# Patient Record
Sex: MALE | Race: BLACK OR AFRICAN AMERICAN | NOT HISPANIC OR LATINO | URBAN - METROPOLITAN AREA
[De-identification: names, ages, dates, MRNs, and addresses within clinical notes are randomized per-mention and may not be internally consistent; named-entity substitution may affect disease eponyms.]

---

## 2017-01-22 ENCOUNTER — EMERGENCY (EMERGENCY)
Facility: HOSPITAL | Age: 27
LOS: 1 days | Discharge: HOME | End: 2017-01-22
Attending: EMERGENCY MEDICINE
Payer: MEDICAID

## 2017-01-22 VITALS
RESPIRATION RATE: 20 BRPM | HEART RATE: 78 BPM | DIASTOLIC BLOOD PRESSURE: 78 MMHG | SYSTOLIC BLOOD PRESSURE: 124 MMHG | OXYGEN SATURATION: 100 % | TEMPERATURE: 98 F

## 2017-01-22 VITALS
TEMPERATURE: 98 F | WEIGHT: 210.1 LBS | HEIGHT: 73 IN | DIASTOLIC BLOOD PRESSURE: 83 MMHG | SYSTOLIC BLOOD PRESSURE: 160 MMHG | RESPIRATION RATE: 24 BRPM | HEART RATE: 75 BPM | OXYGEN SATURATION: 100 %

## 2017-01-22 DIAGNOSIS — K29.20 ALCOHOLIC GASTRITIS WITHOUT BLEEDING: ICD-10-CM

## 2017-01-22 DIAGNOSIS — R06.00 DYSPNEA, UNSPECIFIED: ICD-10-CM

## 2017-01-22 LAB
ALBUMIN SERPL ELPH-MCNC: 4.9 G/DL — SIGNIFICANT CHANGE UP (ref 3.3–5.2)
ALP SERPL-CCNC: 43 U/L — SIGNIFICANT CHANGE UP (ref 40–120)
ALT FLD-CCNC: 40 U/L — SIGNIFICANT CHANGE UP
ANION GAP SERPL CALC-SCNC: 22 MMOL/L — HIGH (ref 5–17)
AST SERPL-CCNC: 44 U/L — HIGH
BASOPHILS # BLD AUTO: 0 K/UL — SIGNIFICANT CHANGE UP (ref 0–0.2)
BASOPHILS NFR BLD AUTO: 0.2 % — SIGNIFICANT CHANGE UP (ref 0–2)
BILIRUB SERPL-MCNC: 0.8 MG/DL — SIGNIFICANT CHANGE UP (ref 0.4–2)
BUN SERPL-MCNC: 18 MG/DL — SIGNIFICANT CHANGE UP (ref 8–20)
CALCIUM SERPL-MCNC: 10.2 MG/DL — SIGNIFICANT CHANGE UP (ref 8.6–10.2)
CHLORIDE SERPL-SCNC: 99 MMOL/L — SIGNIFICANT CHANGE UP (ref 98–107)
CO2 SERPL-SCNC: 20 MMOL/L — LOW (ref 22–29)
CREAT SERPL-MCNC: 1.14 MG/DL — SIGNIFICANT CHANGE UP (ref 0.5–1.3)
GLUCOSE SERPL-MCNC: 129 MG/DL — HIGH (ref 70–115)
HCT VFR BLD CALC: 45.1 % — SIGNIFICANT CHANGE UP (ref 42–52)
HGB BLD-MCNC: 16.4 G/DL — SIGNIFICANT CHANGE UP (ref 14–18)
LIDOCAIN IGE QN: 21 U/L — LOW (ref 22–51)
LYMPHOCYTES # BLD AUTO: 1.4 K/UL — SIGNIFICANT CHANGE UP (ref 1–4.8)
LYMPHOCYTES # BLD AUTO: 12.3 % — LOW (ref 20–55)
MCHC RBC-ENTMCNC: 29.4 PG — SIGNIFICANT CHANGE UP (ref 27–31)
MCHC RBC-ENTMCNC: 36.4 G/DL — HIGH (ref 32–36)
MCV RBC AUTO: 81 FL — SIGNIFICANT CHANGE UP (ref 80–94)
MONOCYTES # BLD AUTO: 0.2 K/UL — SIGNIFICANT CHANGE UP (ref 0–0.8)
MONOCYTES NFR BLD AUTO: 2.2 % — LOW (ref 3–10)
NEUTROPHILS # BLD AUTO: 9.5 K/UL — HIGH (ref 1.8–8)
NEUTROPHILS NFR BLD AUTO: 85 % — HIGH (ref 37–73)
PLATELET # BLD AUTO: 215 K/UL — SIGNIFICANT CHANGE UP (ref 150–400)
POTASSIUM SERPL-MCNC: 3.6 MMOL/L — SIGNIFICANT CHANGE UP (ref 3.5–5.3)
POTASSIUM SERPL-SCNC: 3.6 MMOL/L — SIGNIFICANT CHANGE UP (ref 3.5–5.3)
PROT SERPL-MCNC: 8.1 G/DL — SIGNIFICANT CHANGE UP (ref 6.6–8.7)
RBC # BLD: 5.57 M/UL — SIGNIFICANT CHANGE UP (ref 4.6–6.2)
RBC # FLD: 13.2 % — SIGNIFICANT CHANGE UP (ref 11–15.6)
SODIUM SERPL-SCNC: 141 MMOL/L — SIGNIFICANT CHANGE UP (ref 135–145)
WBC # BLD: 11.16 K/UL — HIGH (ref 4.8–10.8)
WBC # FLD AUTO: 11.16 K/UL — HIGH (ref 4.8–10.8)

## 2017-01-22 PROCEDURE — 93005 ELECTROCARDIOGRAM TRACING: CPT

## 2017-01-22 PROCEDURE — 99284 EMERGENCY DEPT VISIT MOD MDM: CPT | Mod: 25

## 2017-01-22 PROCEDURE — 85027 COMPLETE CBC AUTOMATED: CPT

## 2017-01-22 PROCEDURE — 96375 TX/PRO/DX INJ NEW DRUG ADDON: CPT

## 2017-01-22 PROCEDURE — 99284 EMERGENCY DEPT VISIT MOD MDM: CPT

## 2017-01-22 PROCEDURE — 83690 ASSAY OF LIPASE: CPT

## 2017-01-22 PROCEDURE — 93010 ELECTROCARDIOGRAM REPORT: CPT

## 2017-01-22 PROCEDURE — 80053 COMPREHEN METABOLIC PANEL: CPT

## 2017-01-22 PROCEDURE — 96374 THER/PROPH/DIAG INJ IV PUSH: CPT

## 2017-01-22 RX ORDER — METOCLOPRAMIDE HCL 10 MG
10 TABLET ORAL ONCE
Qty: 0 | Refills: 0 | Status: COMPLETED | OUTPATIENT
Start: 2017-01-22 | End: 2017-01-22

## 2017-01-22 RX ORDER — ONDANSETRON 8 MG/1
1 TABLET, FILM COATED ORAL
Qty: 16 | Refills: 0 | OUTPATIENT
Start: 2017-01-22 | End: 2017-01-26

## 2017-01-22 RX ORDER — FAMOTIDINE 10 MG/ML
20 INJECTION INTRAVENOUS ONCE
Qty: 0 | Refills: 0 | Status: COMPLETED | OUTPATIENT
Start: 2017-01-22 | End: 2017-01-22

## 2017-01-22 RX ORDER — MORPHINE SULFATE 50 MG/1
4 CAPSULE, EXTENDED RELEASE ORAL ONCE
Qty: 0 | Refills: 0 | Status: DISCONTINUED | OUTPATIENT
Start: 2017-01-22 | End: 2017-01-22

## 2017-01-22 RX ORDER — SODIUM CHLORIDE 9 MG/ML
1000 INJECTION INTRAMUSCULAR; INTRAVENOUS; SUBCUTANEOUS ONCE
Qty: 0 | Refills: 0 | Status: COMPLETED | OUTPATIENT
Start: 2017-01-22 | End: 2017-01-22

## 2017-01-22 RX ORDER — FAMOTIDINE 10 MG/ML
1 INJECTION INTRAVENOUS
Qty: 14 | Refills: 0 | OUTPATIENT
Start: 2017-01-22 | End: 2017-01-29

## 2017-01-22 RX ADMIN — MORPHINE SULFATE 4 MILLIGRAM(S): 50 CAPSULE, EXTENDED RELEASE ORAL at 13:10

## 2017-01-22 RX ADMIN — SODIUM CHLORIDE 1000 MILLILITER(S): 9 INJECTION INTRAMUSCULAR; INTRAVENOUS; SUBCUTANEOUS at 13:10

## 2017-01-22 RX ADMIN — MORPHINE SULFATE 4 MILLIGRAM(S): 50 CAPSULE, EXTENDED RELEASE ORAL at 16:13

## 2017-01-22 RX ADMIN — FAMOTIDINE 20 MILLIGRAM(S): 10 INJECTION INTRAVENOUS at 13:10

## 2017-01-22 RX ADMIN — Medication 10 MILLIGRAM(S): at 13:10

## 2017-01-22 NOTE — ED ADULT NURSE NOTE - CHPI ED SYMPTOMS NEG
no chills/no fever/no dysuria/no burning urination/no blood in stool/no diarrhea/no abdominal distension/no hematuria

## 2018-09-23 ENCOUNTER — EMERGENCY (EMERGENCY)
Facility: HOSPITAL | Age: 28
LOS: 1 days | Discharge: HOME | End: 2018-09-23
Attending: EMERGENCY MEDICINE
Payer: MEDICAID

## 2018-09-23 VITALS
TEMPERATURE: 99 F | RESPIRATION RATE: 18 BRPM | SYSTOLIC BLOOD PRESSURE: 143 MMHG | OXYGEN SATURATION: 100 % | HEART RATE: 82 BPM | WEIGHT: 179.9 LBS | DIASTOLIC BLOOD PRESSURE: 94 MMHG

## 2018-09-23 PROCEDURE — 99283 EMERGENCY DEPT VISIT LOW MDM: CPT

## 2018-09-23 RX ORDER — AMOXICILLIN 250 MG/5ML
2 SUSPENSION, RECONSTITUTED, ORAL (ML) ORAL
Qty: 40 | Refills: 0 | OUTPATIENT
Start: 2018-09-23 | End: 2018-10-02

## 2018-09-23 NOTE — ED PROVIDER NOTE - ENMT NEGATIVE STATEMENT, MLM
Ears: no ear pain and no hearing problems.Nose: no nasal congestion and no nasal drainage.Mouth/Throat: no dysphagia, no hoarseness and +THROAT PAIN. .Neck: no lumps, no pain, no stiffness and no swollen glands.

## 2018-09-23 NOTE — ED PROVIDER NOTE - ENMT, MLM
Airway patent, Nasal mucosa clear. Mouth with normal mucosa. uvula is midline.  Exudated pharyngitis, no PTA

## 2018-09-23 NOTE — ED PROVIDER NOTE - OBJECTIVE STATEMENT
27 y/o M, with no pertinent medical hx, presents to the ED c/o throat pain, onset 3 days ago.  Pt states     tylenol PM 29 y/o M, with no pertinent medical hx, presents to the ED c/o constant throat pain, onset 3 days ago.  Pt states that it is painful to swallow and feels like his throat is swollen.  Self medicating with Tylenol PM at night, with mild relief. denies fever. denies HA or neck pain. no chest pain or sob. no abd pain. no n/v/d. no urinary f/u/d. no back pain. no motor or sensory deficits. denies illicit drug use. no recent travel. no rash. no other acute issues symptoms or concerns

## 2019-08-06 ENCOUNTER — EMERGENCY (EMERGENCY)
Facility: HOSPITAL | Age: 29
LOS: 1 days | Discharge: HOME | End: 2019-08-06
Attending: EMERGENCY MEDICINE | Admitting: EMERGENCY MEDICINE
Payer: SELF-PAY

## 2019-08-06 VITALS
OXYGEN SATURATION: 97 % | DIASTOLIC BLOOD PRESSURE: 76 MMHG | RESPIRATION RATE: 20 BRPM | HEART RATE: 81 BPM | WEIGHT: 179.9 LBS | SYSTOLIC BLOOD PRESSURE: 107 MMHG

## 2019-08-06 LAB
ALBUMIN SERPL ELPH-MCNC: 3.9 G/DL — SIGNIFICANT CHANGE UP (ref 3.3–5)
ALP SERPL-CCNC: 48 U/L — SIGNIFICANT CHANGE UP (ref 40–120)
ALT FLD-CCNC: 23 U/L — SIGNIFICANT CHANGE UP (ref 12–78)
AMYLASE P1 CFR SERPL: 40 U/L — SIGNIFICANT CHANGE UP (ref 25–125)
ANION GAP SERPL CALC-SCNC: 8 MMOL/L — SIGNIFICANT CHANGE UP (ref 5–17)
AST SERPL-CCNC: 24 U/L — SIGNIFICANT CHANGE UP (ref 15–37)
BILIRUB SERPL-MCNC: 0.6 MG/DL — SIGNIFICANT CHANGE UP (ref 0.2–1.2)
BUN SERPL-MCNC: 9 MG/DL — SIGNIFICANT CHANGE UP (ref 7–23)
CALCIUM SERPL-MCNC: 9.2 MG/DL — SIGNIFICANT CHANGE UP (ref 8.5–10.1)
CHLORIDE SERPL-SCNC: 107 MMOL/L — SIGNIFICANT CHANGE UP (ref 96–108)
CO2 SERPL-SCNC: 27 MMOL/L — SIGNIFICANT CHANGE UP (ref 22–31)
CREAT SERPL-MCNC: 1.1 MG/DL — SIGNIFICANT CHANGE UP (ref 0.5–1.3)
GLUCOSE SERPL-MCNC: 121 MG/DL — HIGH (ref 70–99)
HCT VFR BLD CALC: 46.1 % — SIGNIFICANT CHANGE UP (ref 39–50)
HGB BLD-MCNC: 15.6 G/DL — SIGNIFICANT CHANGE UP (ref 13–17)
LIDOCAIN IGE QN: 105 U/L — SIGNIFICANT CHANGE UP (ref 73–393)
MCHC RBC-ENTMCNC: 28.3 PG — SIGNIFICANT CHANGE UP (ref 27–34)
MCHC RBC-ENTMCNC: 33.8 GM/DL — SIGNIFICANT CHANGE UP (ref 32–36)
MCV RBC AUTO: 83.5 FL — SIGNIFICANT CHANGE UP (ref 80–100)
NRBC # BLD: 0 /100 WBCS — SIGNIFICANT CHANGE UP (ref 0–0)
PLATELET # BLD AUTO: 229 K/UL — SIGNIFICANT CHANGE UP (ref 150–400)
POTASSIUM SERPL-MCNC: 4.1 MMOL/L — SIGNIFICANT CHANGE UP (ref 3.5–5.3)
POTASSIUM SERPL-SCNC: 4.1 MMOL/L — SIGNIFICANT CHANGE UP (ref 3.5–5.3)
PROT SERPL-MCNC: 7.7 G/DL — SIGNIFICANT CHANGE UP (ref 6–8.3)
RBC # BLD: 5.52 M/UL — SIGNIFICANT CHANGE UP (ref 4.2–5.8)
RBC # FLD: 13.4 % — SIGNIFICANT CHANGE UP (ref 10.3–14.5)
SODIUM SERPL-SCNC: 142 MMOL/L — SIGNIFICANT CHANGE UP (ref 135–145)
WBC # BLD: 11.11 K/UL — HIGH (ref 3.8–10.5)
WBC # FLD AUTO: 11.11 K/UL — HIGH (ref 3.8–10.5)

## 2019-08-06 PROCEDURE — 99284 EMERGENCY DEPT VISIT MOD MDM: CPT

## 2019-08-06 RX ORDER — IOHEXOL 300 MG/ML
30 INJECTION, SOLUTION INTRAVENOUS ONCE
Refills: 0 | Status: COMPLETED | OUTPATIENT
Start: 2019-08-06 | End: 2019-08-06

## 2019-08-06 RX ORDER — ONDANSETRON 8 MG/1
1 TABLET, FILM COATED ORAL
Qty: 15 | Refills: 0
Start: 2019-08-06 | End: 2019-08-10

## 2019-08-06 RX ORDER — SODIUM CHLORIDE 9 MG/ML
1000 INJECTION INTRAMUSCULAR; INTRAVENOUS; SUBCUTANEOUS ONCE
Refills: 0 | Status: COMPLETED | OUTPATIENT
Start: 2019-08-06 | End: 2019-08-06

## 2019-08-06 RX ORDER — ONDANSETRON 8 MG/1
8 TABLET, FILM COATED ORAL ONCE
Refills: 0 | Status: COMPLETED | OUTPATIENT
Start: 2019-08-06 | End: 2019-08-06

## 2019-08-06 RX ORDER — METOCLOPRAMIDE HCL 10 MG
10 TABLET ORAL ONCE
Refills: 0 | Status: COMPLETED | OUTPATIENT
Start: 2019-08-06 | End: 2019-08-06

## 2019-08-06 RX ORDER — FAMOTIDINE 10 MG/ML
20 INJECTION INTRAVENOUS ONCE
Refills: 0 | Status: COMPLETED | OUTPATIENT
Start: 2019-08-06 | End: 2019-08-06

## 2019-08-06 RX ADMIN — IOHEXOL 30 MILLILITER(S): 300 INJECTION, SOLUTION INTRAVENOUS at 23:04

## 2019-08-06 RX ADMIN — ONDANSETRON 8 MILLIGRAM(S): 8 TABLET, FILM COATED ORAL at 19:37

## 2019-08-06 RX ADMIN — Medication 1 MILLIGRAM(S): at 19:37

## 2019-08-06 RX ADMIN — SODIUM CHLORIDE 1000 MILLILITER(S): 9 INJECTION INTRAMUSCULAR; INTRAVENOUS; SUBCUTANEOUS at 18:12

## 2019-08-06 RX ADMIN — SODIUM CHLORIDE 1000 MILLILITER(S): 9 INJECTION INTRAMUSCULAR; INTRAVENOUS; SUBCUTANEOUS at 19:37

## 2019-08-06 RX ADMIN — Medication 1 MILLIGRAM(S): at 23:03

## 2019-08-06 RX ADMIN — FAMOTIDINE 20 MILLIGRAM(S): 10 INJECTION INTRAVENOUS at 18:11

## 2019-08-06 RX ADMIN — ONDANSETRON 8 MILLIGRAM(S): 8 TABLET, FILM COATED ORAL at 23:04

## 2019-08-06 RX ADMIN — Medication 10 MILLIGRAM(S): at 18:11

## 2019-08-06 NOTE — ED PROVIDER NOTE - OBJECTIVE STATEMENT
30 yo healthy black male with nausea and vomiting post eating at local restaurant short while ago. States that he first became nauseated which was followed by bilious emesis w/o any blood and post vomiting felt weak and sweaty. Minimal if any abdominal pains. No diarrhea and no dysuria or hematuria.

## 2019-08-06 NOTE — ED ADULT NURSE NOTE - CAS DISCH TRANSFER METHOD
Principal Discharge DX:	Hypertensive emergency  Secondary Diagnosis:	Intracranial hemorrhage
Private car

## 2019-08-06 NOTE — ED PROVIDER NOTE - PROGRESS NOTE DETAILS
patient feeling better, tolerating po patient now sating he has RLQ pain, return of nausea, to get ct abdomen/pelvis r/o Appy patient alert and awake.  Feels well, no nausea or abdominal pain.  Father will pick him up in the ED.  Gian and zofran already sent

## 2019-08-06 NOTE — ED ADULT NURSE NOTE - NSIMPLEMENTINTERV_GEN_ALL_ED
Implemented All Universal Safety Interventions:  [place] to call system. Call bell, personal items and telephone within reach. Instruct patient to call for assistance. Room bathroom lighting operational. Non-slip footwear when patient is off stretcher. Physically safe environment: no spills, clutter or unnecessary equipment. Stretcher in lowest position, wheels locked, appropriate side rails in place.

## 2019-08-06 NOTE — ED PROVIDER NOTE - CONSTITUTIONAL, MLM
normal... Uncomfortable appearing, sweaty black male, well nourished, awake, alert, oriented to person, place, time/situation and in mild apparent distress.

## 2019-08-06 NOTE — ED PROVIDER NOTE - CARE PLAN
Principal Discharge DX:	Bilious vomiting with nausea Principal Discharge DX:	Bilious vomiting with nausea  Secondary Diagnosis:	Pancolitis

## 2019-08-07 VITALS
DIASTOLIC BLOOD PRESSURE: 69 MMHG | TEMPERATURE: 98 F | SYSTOLIC BLOOD PRESSURE: 132 MMHG | RESPIRATION RATE: 17 BRPM | HEART RATE: 69 BPM | OXYGEN SATURATION: 99 %

## 2019-08-07 LAB
AMPHET UR-MCNC: NEGATIVE — SIGNIFICANT CHANGE UP
APPEARANCE UR: CLEAR — SIGNIFICANT CHANGE UP
BARBITURATES UR SCN-MCNC: NEGATIVE — SIGNIFICANT CHANGE UP
BASOPHILS # BLD AUTO: 0.01 K/UL — SIGNIFICANT CHANGE UP (ref 0–0.2)
BASOPHILS NFR BLD AUTO: 0.1 % — SIGNIFICANT CHANGE UP (ref 0–2)
BENZODIAZ UR-MCNC: NEGATIVE — SIGNIFICANT CHANGE UP
BILIRUB UR-MCNC: NEGATIVE — SIGNIFICANT CHANGE UP
COCAINE METAB.OTHER UR-MCNC: NEGATIVE — SIGNIFICANT CHANGE UP
COLOR SPEC: SIGNIFICANT CHANGE UP
DIFF PNL FLD: NEGATIVE — SIGNIFICANT CHANGE UP
EOSINOPHIL # BLD AUTO: 0.01 K/UL — SIGNIFICANT CHANGE UP (ref 0–0.5)
EOSINOPHIL NFR BLD AUTO: 0.1 % — SIGNIFICANT CHANGE UP (ref 0–6)
GLUCOSE UR QL: NEGATIVE — SIGNIFICANT CHANGE UP
HCT VFR BLD CALC: 39 % — SIGNIFICANT CHANGE UP (ref 39–50)
HGB BLD-MCNC: 13.1 G/DL — SIGNIFICANT CHANGE UP (ref 13–17)
IMM GRANULOCYTES NFR BLD AUTO: 0.3 % — SIGNIFICANT CHANGE UP (ref 0–1.5)
KETONES UR-MCNC: ABNORMAL
LEUKOCYTE ESTERASE UR-ACNC: NEGATIVE — SIGNIFICANT CHANGE UP
LYMPHOCYTES # BLD AUTO: 1.11 K/UL — SIGNIFICANT CHANGE UP (ref 1–3.3)
LYMPHOCYTES # BLD AUTO: 14.1 % — SIGNIFICANT CHANGE UP (ref 13–44)
MCHC RBC-ENTMCNC: 28.1 PG — SIGNIFICANT CHANGE UP (ref 27–34)
MCHC RBC-ENTMCNC: 33.6 GM/DL — SIGNIFICANT CHANGE UP (ref 32–36)
MCV RBC AUTO: 83.7 FL — SIGNIFICANT CHANGE UP (ref 80–100)
METHADONE UR-MCNC: NEGATIVE — SIGNIFICANT CHANGE UP
MONOCYTES # BLD AUTO: 0.23 K/UL — SIGNIFICANT CHANGE UP (ref 0–0.9)
MONOCYTES NFR BLD AUTO: 2.9 % — SIGNIFICANT CHANGE UP (ref 2–14)
NEUTROPHILS # BLD AUTO: 6.52 K/UL — SIGNIFICANT CHANGE UP (ref 1.8–7.4)
NEUTROPHILS NFR BLD AUTO: 82.5 % — HIGH (ref 43–77)
NITRITE UR-MCNC: NEGATIVE — SIGNIFICANT CHANGE UP
NRBC # BLD: 0 /100 WBCS — SIGNIFICANT CHANGE UP (ref 0–0)
OPIATES UR-MCNC: POSITIVE — SIGNIFICANT CHANGE UP
PCP SPEC-MCNC: SIGNIFICANT CHANGE UP
PCP UR-MCNC: NEGATIVE — SIGNIFICANT CHANGE UP
PH UR: 6.5 — SIGNIFICANT CHANGE UP (ref 5–8)
PLATELET # BLD AUTO: 187 K/UL — SIGNIFICANT CHANGE UP (ref 150–400)
PROT UR-MCNC: NEGATIVE — SIGNIFICANT CHANGE UP
RBC # BLD: 4.66 M/UL — SIGNIFICANT CHANGE UP (ref 4.2–5.8)
RBC # FLD: 13.5 % — SIGNIFICANT CHANGE UP (ref 10.3–14.5)
SP GR SPEC: 1 — LOW (ref 1.01–1.02)
THC UR QL: POSITIVE — SIGNIFICANT CHANGE UP
UROBILINOGEN FLD QL: NEGATIVE — SIGNIFICANT CHANGE UP
WBC # BLD: 7.9 K/UL — SIGNIFICANT CHANGE UP (ref 3.8–10.5)
WBC # FLD AUTO: 7.9 K/UL — SIGNIFICANT CHANGE UP (ref 3.8–10.5)

## 2019-08-07 PROCEDURE — 80307 DRUG TEST PRSMV CHEM ANLYZR: CPT

## 2019-08-07 PROCEDURE — 83690 ASSAY OF LIPASE: CPT

## 2019-08-07 PROCEDURE — 96368 THER/DIAG CONCURRENT INF: CPT

## 2019-08-07 PROCEDURE — 96367 TX/PROPH/DG ADDL SEQ IV INF: CPT

## 2019-08-07 PROCEDURE — 85027 COMPLETE CBC AUTOMATED: CPT

## 2019-08-07 PROCEDURE — 96375 TX/PRO/DX INJ NEW DRUG ADDON: CPT

## 2019-08-07 PROCEDURE — 96361 HYDRATE IV INFUSION ADD-ON: CPT

## 2019-08-07 PROCEDURE — 80053 COMPREHEN METABOLIC PANEL: CPT

## 2019-08-07 PROCEDURE — 99284 EMERGENCY DEPT VISIT MOD MDM: CPT | Mod: 25

## 2019-08-07 PROCEDURE — 96365 THER/PROPH/DIAG IV INF INIT: CPT | Mod: 59

## 2019-08-07 PROCEDURE — 74177 CT ABD & PELVIS W/CONTRAST: CPT

## 2019-08-07 PROCEDURE — 82150 ASSAY OF AMYLASE: CPT

## 2019-08-07 PROCEDURE — 74177 CT ABD & PELVIS W/CONTRAST: CPT | Mod: 26

## 2019-08-07 PROCEDURE — 81003 URINALYSIS AUTO W/O SCOPE: CPT

## 2019-08-07 PROCEDURE — 96366 THER/PROPH/DIAG IV INF ADDON: CPT

## 2019-08-07 PROCEDURE — 96376 TX/PRO/DX INJ SAME DRUG ADON: CPT

## 2019-08-07 PROCEDURE — 36415 COLL VENOUS BLD VENIPUNCTURE: CPT

## 2019-08-07 RX ORDER — CIPROFLOXACIN LACTATE 400MG/40ML
400 VIAL (ML) INTRAVENOUS ONCE
Refills: 0 | Status: COMPLETED | OUTPATIENT
Start: 2019-08-07 | End: 2019-08-07

## 2019-08-07 RX ORDER — METOCLOPRAMIDE HCL 10 MG
10 TABLET ORAL ONCE
Refills: 0 | Status: COMPLETED | OUTPATIENT
Start: 2019-08-07 | End: 2019-08-07

## 2019-08-07 RX ORDER — PANTOPRAZOLE SODIUM 20 MG/1
40 TABLET, DELAYED RELEASE ORAL ONCE
Refills: 0 | Status: COMPLETED | OUTPATIENT
Start: 2019-08-07 | End: 2019-08-07

## 2019-08-07 RX ORDER — MOXIFLOXACIN HYDROCHLORIDE TABLETS, 400 MG 400 MG/1
1 TABLET, FILM COATED ORAL
Qty: 20 | Refills: 0
Start: 2019-08-07 | End: 2019-08-16

## 2019-08-07 RX ORDER — METRONIDAZOLE 500 MG
1 TABLET ORAL
Qty: 30 | Refills: 0
Start: 2019-08-07 | End: 2019-08-16

## 2019-08-07 RX ORDER — HYDROMORPHONE HYDROCHLORIDE 2 MG/ML
0.5 INJECTION INTRAMUSCULAR; INTRAVENOUS; SUBCUTANEOUS ONCE
Refills: 0 | Status: DISCONTINUED | OUTPATIENT
Start: 2019-08-07 | End: 2019-08-07

## 2019-08-07 RX ORDER — METRONIDAZOLE 500 MG
500 TABLET ORAL ONCE
Refills: 0 | Status: COMPLETED | OUTPATIENT
Start: 2019-08-07 | End: 2019-08-07

## 2019-08-07 RX ADMIN — SODIUM CHLORIDE 1000 MILLILITER(S): 9 INJECTION INTRAMUSCULAR; INTRAVENOUS; SUBCUTANEOUS at 00:25

## 2019-08-07 RX ADMIN — PANTOPRAZOLE SODIUM 40 MILLIGRAM(S): 20 TABLET, DELAYED RELEASE ORAL at 01:17

## 2019-08-07 RX ADMIN — Medication 100 MILLIGRAM(S): at 01:17

## 2019-08-07 RX ADMIN — HYDROMORPHONE HYDROCHLORIDE 0.5 MILLIGRAM(S): 2 INJECTION INTRAMUSCULAR; INTRAVENOUS; SUBCUTANEOUS at 01:17

## 2019-08-07 RX ADMIN — HYDROMORPHONE HYDROCHLORIDE 0.5 MILLIGRAM(S): 2 INJECTION INTRAMUSCULAR; INTRAVENOUS; SUBCUTANEOUS at 00:40

## 2019-08-07 RX ADMIN — Medication 10 MILLIGRAM(S): at 00:40

## 2019-08-07 RX ADMIN — Medication 200 MILLIGRAM(S): at 01:17

## 2019-08-07 RX ADMIN — Medication 500 MILLIGRAM(S): at 06:32

## 2019-08-07 RX ADMIN — Medication 400 MILLIGRAM(S): at 06:32

## 2019-08-07 NOTE — ED ADULT NURSE REASSESSMENT NOTE - NS ED NURSE REASSESS COMMENT FT1
N/V continue despite intervention.  MD aware.  Medicines continue
Report given to Kalpesh CABRERA
pt presently sleeping.  States more comfortable with less N/V

## 2021-07-06 ENCOUNTER — EMERGENCY (EMERGENCY)
Facility: HOSPITAL | Age: 31
LOS: 1 days | Discharge: HOME | End: 2021-07-06
Attending: EMERGENCY MEDICINE
Payer: SELF-PAY

## 2021-07-06 VITALS
OXYGEN SATURATION: 98 % | SYSTOLIC BLOOD PRESSURE: 188 MMHG | HEART RATE: 77 BPM | TEMPERATURE: 98 F | RESPIRATION RATE: 18 BRPM | HEIGHT: 73 IN | DIASTOLIC BLOOD PRESSURE: 77 MMHG | WEIGHT: 190.04 LBS

## 2021-07-06 PROCEDURE — 73630 X-RAY EXAM OF FOOT: CPT | Mod: 26,LT

## 2021-07-06 PROCEDURE — 73630 X-RAY EXAM OF FOOT: CPT

## 2021-07-06 PROCEDURE — 99283 EMERGENCY DEPT VISIT LOW MDM: CPT

## 2021-07-06 PROCEDURE — 73610 X-RAY EXAM OF ANKLE: CPT | Mod: 26,LT

## 2021-07-06 PROCEDURE — 99284 EMERGENCY DEPT VISIT MOD MDM: CPT | Mod: 25

## 2021-07-06 PROCEDURE — 73610 X-RAY EXAM OF ANKLE: CPT

## 2021-07-06 RX ORDER — IBUPROFEN 200 MG
600 TABLET ORAL ONCE
Refills: 0 | Status: COMPLETED | OUTPATIENT
Start: 2021-07-06 | End: 2021-07-06

## 2021-07-06 RX ADMIN — Medication 600 MILLIGRAM(S): at 12:40

## 2021-07-06 NOTE — ED PROVIDER NOTE - PHYSICAL EXAMINATION
Gen: WD/WN male resting on stretcher in NAD AA&Ox4  Skin: +edema to left ankle. No lesions, ecchymosis, discoloration, or jaundice  Cardio: S1, s2 heard. No murmurs gallops or rubs  Pulm: clear to auscultation b/l no wheezes rhonchi or rales  GI: soft nondistended nontender abdomen.   Neuro: Affect appropriate. Sensation intact b/l. Motor 5/5 throughout with pain limited exam of left ankle/foot. Able to wiggle left toes.  MSK: Limited ROM of left ankle/foot. Tender to palpation of left lateral malleolus and left midfoot. Otherwise no tenderness elicited. Able to bear weight, ambulate.  Ext: Warm and dry. Pulses 2+ throughout. Gen: WD/WN male resting on stretcher in NAD AA&Ox4  Skin: +edema to left ankle. No lesions, ecchymosis, discoloration, or jaundice  Cardio: S1, s2 heard. No murmurs gallops or rubs  Pulm: clear to auscultation b/l no wheezes rhonchi or rales.   Neuro: Affect appropriate. Sensation intact b/l. Motor 5/5 throughout with pain limited exam of left ankle/foot. Able to wiggle left toes.  MSK: Limited ROM of left ankle/foot. Tender to palpation of left lateral malleolus and left midfoot. Otherwise no tenderness elicited. Able to bear weight, ambulate.  Ext: Warm and dry. Pulses 2+ throughout.  Vasc: + 2 pedal pulses

## 2021-07-06 NOTE — ED PROVIDER NOTE - PROGRESS NOTE DETAILS
PA NOTE: No acute findings on imaging. Ankle ACE wrappred. Educated on RICE therapy and advised to follow up with Dr. Arango if symptoms persist. Advised to follow up with PCP for repeat BP check.

## 2021-07-06 NOTE — ED PROVIDER NOTE - ATTENDING CONTRIBUTION TO CARE
left ankle injury, pain x 1 day; +soft tissue swelling, +ttp over ATF ligament; no bony point ttp; +normal DP pulse; xray negative; agree with acp plan of care

## 2021-07-06 NOTE — ED PROVIDER NOTE - PATIENT PORTAL LINK FT
You can access the FollowMyHealth Patient Portal offered by [facility] by registering at the following website: [email or web address]. By joining [employer]’s FollowMyHealth portal, you will also be able to view your health information using other applications (apps) compatible with our system.

## 2021-07-06 NOTE — ED PROVIDER NOTE - CARE PROVIDER_API CALL
Hiram Arango)  Orthopaedic Surgery  [address] [place] [address]  Phone: [phone]  Fax: [phone]  Follow Up Time:
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
## 2021-07-06 NOTE — ED PROVIDER NOTE - CLINICAL SUMMARY MEDICAL DECISION MAKING FREE TEXT BOX
30 y/o M with no PMH p/w left ankle injury x 2 days. Will xray left foot and ankle and splint or fracture shoe as appropriate

## 2021-08-04 ENCOUNTER — EMERGENCY (EMERGENCY)
Facility: HOSPITAL | Age: 31
LOS: 1 days | Discharge: HOME | End: 2021-08-04
Attending: EMERGENCY MEDICINE
Payer: SELF-PAY

## 2021-08-04 VITALS
HEART RATE: 68 BPM | SYSTOLIC BLOOD PRESSURE: 132 MMHG | OXYGEN SATURATION: 98 % | DIASTOLIC BLOOD PRESSURE: 82 MMHG | RESPIRATION RATE: 18 BRPM | TEMPERATURE: 98 F

## 2021-08-04 VITALS
RESPIRATION RATE: 18 BRPM | TEMPERATURE: 98 F | HEART RATE: 63 BPM | SYSTOLIC BLOOD PRESSURE: 134 MMHG | DIASTOLIC BLOOD PRESSURE: 89 MMHG | WEIGHT: 199.96 LBS | OXYGEN SATURATION: 97 % | HEIGHT: 73 IN

## 2021-08-04 LAB
ALBUMIN SERPL ELPH-MCNC: 4.8 G/DL — SIGNIFICANT CHANGE UP (ref 3.3–5.2)
ALP SERPL-CCNC: 56 U/L — SIGNIFICANT CHANGE UP (ref 40–120)
ALT FLD-CCNC: 26 U/L — SIGNIFICANT CHANGE UP
ANION GAP SERPL CALC-SCNC: 13 MMOL/L — SIGNIFICANT CHANGE UP (ref 5–17)
AST SERPL-CCNC: 55 U/L — HIGH
BASOPHILS # BLD AUTO: 0.01 K/UL — SIGNIFICANT CHANGE UP (ref 0–0.2)
BASOPHILS NFR BLD AUTO: 0.1 % — SIGNIFICANT CHANGE UP (ref 0–2)
BILIRUB SERPL-MCNC: 0.5 MG/DL — SIGNIFICANT CHANGE UP (ref 0.4–2)
BUN SERPL-MCNC: 12.9 MG/DL — SIGNIFICANT CHANGE UP (ref 8–20)
CALCIUM SERPL-MCNC: 9.8 MG/DL — SIGNIFICANT CHANGE UP (ref 8.6–10.2)
CHLORIDE SERPL-SCNC: 103 MMOL/L — SIGNIFICANT CHANGE UP (ref 98–107)
CO2 SERPL-SCNC: 21 MMOL/L — LOW (ref 22–29)
CREAT SERPL-MCNC: 0.88 MG/DL — SIGNIFICANT CHANGE UP (ref 0.5–1.3)
EOSINOPHIL # BLD AUTO: 0 K/UL — SIGNIFICANT CHANGE UP (ref 0–0.5)
EOSINOPHIL NFR BLD AUTO: 0 % — SIGNIFICANT CHANGE UP (ref 0–6)
GLUCOSE SERPL-MCNC: 117 MG/DL — HIGH (ref 70–99)
HCT VFR BLD CALC: 43.6 % — SIGNIFICANT CHANGE UP (ref 39–50)
HGB BLD-MCNC: 15.1 G/DL — SIGNIFICANT CHANGE UP (ref 13–17)
IMM GRANULOCYTES NFR BLD AUTO: 0.3 % — SIGNIFICANT CHANGE UP (ref 0–1.5)
LIDOCAIN IGE QN: 17 U/L — LOW (ref 22–51)
LYMPHOCYTES # BLD AUTO: 1.44 K/UL — SIGNIFICANT CHANGE UP (ref 1–3.3)
LYMPHOCYTES # BLD AUTO: 12.7 % — LOW (ref 13–44)
MCHC RBC-ENTMCNC: 28.3 PG — SIGNIFICANT CHANGE UP (ref 27–34)
MCHC RBC-ENTMCNC: 34.6 GM/DL — SIGNIFICANT CHANGE UP (ref 32–36)
MCV RBC AUTO: 81.8 FL — SIGNIFICANT CHANGE UP (ref 80–100)
MONOCYTES # BLD AUTO: 0.26 K/UL — SIGNIFICANT CHANGE UP (ref 0–0.9)
MONOCYTES NFR BLD AUTO: 2.3 % — SIGNIFICANT CHANGE UP (ref 2–14)
NEUTROPHILS # BLD AUTO: 9.59 K/UL — HIGH (ref 1.8–7.4)
NEUTROPHILS NFR BLD AUTO: 84.6 % — HIGH (ref 43–77)
PLATELET # BLD AUTO: 217 K/UL — SIGNIFICANT CHANGE UP (ref 150–400)
POTASSIUM SERPL-MCNC: 3.8 MMOL/L — SIGNIFICANT CHANGE UP (ref 3.5–5.3)
POTASSIUM SERPL-SCNC: 3.8 MMOL/L — SIGNIFICANT CHANGE UP (ref 3.5–5.3)
PROT SERPL-MCNC: 7.9 G/DL — SIGNIFICANT CHANGE UP (ref 6.6–8.7)
RBC # BLD: 5.33 M/UL — SIGNIFICANT CHANGE UP (ref 4.2–5.8)
RBC # FLD: 13.7 % — SIGNIFICANT CHANGE UP (ref 10.3–14.5)
SODIUM SERPL-SCNC: 137 MMOL/L — SIGNIFICANT CHANGE UP (ref 135–145)
TROPONIN T SERPL-MCNC: <0.01 NG/ML — SIGNIFICANT CHANGE UP (ref 0–0.06)
WBC # BLD: 11.33 K/UL — HIGH (ref 3.8–10.5)
WBC # FLD AUTO: 11.33 K/UL — HIGH (ref 3.8–10.5)

## 2021-08-04 PROCEDURE — 96374 THER/PROPH/DIAG INJ IV PUSH: CPT

## 2021-08-04 PROCEDURE — 36415 COLL VENOUS BLD VENIPUNCTURE: CPT

## 2021-08-04 PROCEDURE — 93010 ELECTROCARDIOGRAM REPORT: CPT

## 2021-08-04 PROCEDURE — 80053 COMPREHEN METABOLIC PANEL: CPT

## 2021-08-04 PROCEDURE — 71046 X-RAY EXAM CHEST 2 VIEWS: CPT

## 2021-08-04 PROCEDURE — 85025 COMPLETE CBC W/AUTO DIFF WBC: CPT

## 2021-08-04 PROCEDURE — 93005 ELECTROCARDIOGRAM TRACING: CPT

## 2021-08-04 PROCEDURE — 71046 X-RAY EXAM CHEST 2 VIEWS: CPT | Mod: 26

## 2021-08-04 PROCEDURE — 96375 TX/PRO/DX INJ NEW DRUG ADDON: CPT

## 2021-08-04 PROCEDURE — 99285 EMERGENCY DEPT VISIT HI MDM: CPT

## 2021-08-04 PROCEDURE — 99284 EMERGENCY DEPT VISIT MOD MDM: CPT | Mod: 25

## 2021-08-04 PROCEDURE — 84484 ASSAY OF TROPONIN QUANT: CPT

## 2021-08-04 PROCEDURE — 83690 ASSAY OF LIPASE: CPT

## 2021-08-04 RX ORDER — ONDANSETRON 8 MG/1
4 TABLET, FILM COATED ORAL ONCE
Refills: 0 | Status: COMPLETED | OUTPATIENT
Start: 2021-08-04 | End: 2021-08-04

## 2021-08-04 RX ORDER — FAMOTIDINE 10 MG/ML
20 INJECTION INTRAVENOUS ONCE
Refills: 0 | Status: COMPLETED | OUTPATIENT
Start: 2021-08-04 | End: 2021-08-04

## 2021-08-04 RX ORDER — KETOROLAC TROMETHAMINE 30 MG/ML
15 SYRINGE (ML) INJECTION ONCE
Refills: 0 | Status: DISCONTINUED | OUTPATIENT
Start: 2021-08-04 | End: 2021-08-04

## 2021-08-04 RX ORDER — SODIUM CHLORIDE 9 MG/ML
1000 INJECTION INTRAMUSCULAR; INTRAVENOUS; SUBCUTANEOUS ONCE
Refills: 0 | Status: COMPLETED | OUTPATIENT
Start: 2021-08-04 | End: 2021-08-04

## 2021-08-04 RX ADMIN — Medication 30 MILLILITER(S): at 20:18

## 2021-08-04 RX ADMIN — FAMOTIDINE 20 MILLIGRAM(S): 10 INJECTION INTRAVENOUS at 20:18

## 2021-08-04 RX ADMIN — ONDANSETRON 4 MILLIGRAM(S): 8 TABLET, FILM COATED ORAL at 20:18

## 2021-08-04 RX ADMIN — SODIUM CHLORIDE 1000 MILLILITER(S): 9 INJECTION INTRAMUSCULAR; INTRAVENOUS; SUBCUTANEOUS at 20:18

## 2021-08-04 RX ADMIN — Medication 15 MILLIGRAM(S): at 20:18

## 2021-08-04 NOTE — ED STATDOCS - NS ED ROS FT
Review of Systems  •	CONSTITUTIONAL - no  fever, no diaphoresis, no weight change  •	SKIN - no rash  •	HEMATOLOGIC - no bleeding, no bruising  •	EYES - no eye pain, no blurred vision  •	ENT - no change in hearing, no pain  •	RESPIRATORY - no shortness of breath, no cough  •	CARDIAC - + chest pain, no palpitations  •	GI - + abd pain, + nausea, + vomiting, no diarrhea, no constipation, no bleeding  •	GENITO-URINARY - no discharge, no dysuria; no hematuria,   •	ENDO - no polydipsia, no polyuria, no heat/no cold intolerance  •	MUSCULOSKELETAL - no joint pain, no swelling, no redness  •	NEUROLOGIC - no weakness, no headache, no anesthesia, no paresthesias  •	PSYCH - no anxiety, non suicidal, non homicidal, no hallucination, no depression
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
## 2021-08-04 NOTE — ED STATDOCS - PATIENT PORTAL LINK FT
You can access the FollowMyHealth Patient Portal offered by [facility] by registering at the following website: [email or web address]. By joining [employer]’s FollowMyHealth portal, you will also be able to view your health information using other applications (apps) compatible with our system.

## 2021-08-04 NOTE — ED STATDOCS - PHYSICAL EXAMINATION
VITAL SIGNS: I have reviewed nursing notes and confirm.  CONSTITUTIONAL:  in no acute distress.  SKIN: Skin exam is warm and dry, no acute rash.  HEAD: Normocephalic; atraumatic.  EYES: PERRL, EOM intact; conjunctiva and sclera clear.  ENT: No nasal discharge; airway clear. Throat clear.  NECK: Supple; non tender.    CARD: Regular rate and rhythm.  RESP: No wheezes,  no rales or rhonchi.   ABD:  soft; non-distended; diffuse epigastric pain    EXT: Normal ROM. No clubbing, cyanosis or edema. + chest wall tenderness  NEURO: Alert, oriented. Grossly unremarkable. No focal deficits.  moves all extremities,  normal gait   PSYCH: Cooperative, appropriate.

## 2021-08-04 NOTE — ED STATDOCS - PROGRESS NOTE DETAILS
DANNY Rivera: PT evaluated by intake physician. HPI/PE/ROS as noted above. Will follow up plan per intake physician DANNY Rivera: Labs reviewed, no emergent intervention indicated. trop negative. CXR clear. Pt re-assessed at this time, feeling much better, tolerating PO intake. abd soft/non-tender. All results reviewed with pt, all questions answered. Pt provided copy of all results. Return precautions given. Stable for dc.

## 2021-08-04 NOTE — ED STATDOCS - ATTENDING CONTRIBUTION TO CARE
I, Jian Vega, performed the initial face to face bedside interview with this patient regarding history of present illness, review of symptoms and relevant past medical, social and family history.  I completed an independent physical examination.  I was the initial provider who evaluated this patient. I have signed out the follow up of any pending tests (i.e. labs, radiological studies) to the ACP.  I have communicated the patient’s plan of care and disposition with the ACP.

## 2021-08-04 NOTE — ED STATDOCS - CLINICAL SUMMARY MEDICAL DECISION MAKING FREE TEXT BOX
Pt p/w  epigasric pain ches tpian N/V  etoh use yesterday with marijuana use yesterday. EKG unremarkable will get blood work, chest x-ray, IV fluid for hydration, Maalox, Pepcid and Zofran. Toradol for pain

## 2021-08-20 NOTE — ED PROVIDER NOTE - DOMESTIC TRAVEL HIGH RISK QUESTION
Patient: Jaimie Humphries MRN: [id number]  SSN: [id number]    YOB: 1980  Age: 46 y.o. Sex: female      DIAGNOSIS:  Recurrent breast cancer    TREATMENT SITE:  L chest wall    DOSE and FRACTIONATION:  7 of 30 fractions; 1400 of 6000cGy    INTERVAL HISTORY:  Jaimie Humphries is a 46 y.o. female being treated for recurrent L breast cancer. Week 1: No complaints. Week 2: L shoulder stiffness and pain with treatment positioning that lasts throughout the afternoon. OBJECTIVE:  NAD  Visit Vitals  /74 (BP 1 Location: Right upper arm, BP Patient Position: Sitting)   Pulse 72   Temp 98.4 °F (36.9 °C)   Wt 72.7 kg (160 lb 4.8 oz)   SpO2 100%   BMI 27.52 kg/m²       Lab Results   Component Value Date/Time    Sodium 139 07/28/2021 07:50 AM    Potassium 3.8 07/28/2021 07:50 AM    Chloride 106 07/28/2021 07:50 AM    CO2 25 07/28/2021 07:50 AM    Anion gap 8 07/28/2021 07:50 AM    Glucose 93 07/28/2021 07:50 AM    BUN 9 07/28/2021 07:50 AM    Creatinine 0.60 07/28/2021 07:50 AM    GFR est AA >60 07/28/2021 07:50 AM    GFR est non-AA >60 07/28/2021 07:50 AM    Calcium 8.5 07/28/2021 07:50 AM    Magnesium 1.8 07/28/2021 07:50 AM    Albumin 3.5 07/28/2021 07:50 AM    Protein, total 6.3 07/28/2021 07:50 AM    Globulin 2.8 07/28/2021 07:50 AM    A-G Ratio 1.3 07/28/2021 07:50 AM    ALT (SGPT) 54 07/28/2021 07:50 AM     Lab Results   Component Value Date/Time    WBC 4.6 07/28/2021 07:50 AM    HGB 7.9 (L) 07/28/2021 07:50 AM    HCT 24.2 (L) 07/28/2021 07:50 AM    PLATELET 197 (L) 69/26/7906 07:50 AM    Hgb, External 12.8 12/01/2011 12:00 AM    Hct, External 37.6 12/01/2011 12:00 AM    Platelet cnt., External 210 12/01/2011 12:00 AM       ASSESSMENT and PLAN:  Jaimie Humphries is tolerating radiation as anticipated for the current dose and fraction. We will continue on as planned with another treatment visit anticipated next week.     - Reviewed moisturizer for skin care and anticipated toxicities of treatment  - Recommended gentle stretching exercises and scheduled Ibuprofen, will prescribe tramadol in addition if symptoms are not controlled with anti-inflammatories    Manisha Malik MD   August 20, 2021 Yes

## 2021-12-02 ENCOUNTER — EMERGENCY (EMERGENCY)
Facility: HOSPITAL | Age: 31
LOS: 1 days | Discharge: HOME | End: 2021-12-02
Attending: EMERGENCY MEDICINE
Payer: MEDICAID

## 2021-12-02 VITALS
DIASTOLIC BLOOD PRESSURE: 94 MMHG | SYSTOLIC BLOOD PRESSURE: 141 MMHG | HEIGHT: 73 IN | OXYGEN SATURATION: 99 % | HEART RATE: 85 BPM | RESPIRATION RATE: 18 BRPM | TEMPERATURE: 98 F | WEIGHT: 201.06 LBS

## 2021-12-02 PROCEDURE — 90471 IMMUNIZATION ADMIN: CPT

## 2021-12-02 PROCEDURE — 99283 EMERGENCY DEPT VISIT LOW MDM: CPT | Mod: 25

## 2021-12-02 PROCEDURE — 90715 TDAP VACCINE 7 YRS/> IM: CPT

## 2021-12-02 PROCEDURE — 99284 EMERGENCY DEPT VISIT MOD MDM: CPT

## 2021-12-02 RX ORDER — CIPROFLOXACIN LACTATE 400MG/40ML
750 VIAL (ML) INTRAVENOUS ONCE
Refills: 0 | Status: COMPLETED | OUTPATIENT
Start: 2021-12-02 | End: 2021-12-02

## 2021-12-02 RX ORDER — TETANUS TOXOID, REDUCED DIPHTHERIA TOXOID AND ACELLULAR PERTUSSIS VACCINE, ADSORBED 5; 2.5; 8; 8; 2.5 [IU]/.5ML; [IU]/.5ML; UG/.5ML; UG/.5ML; UG/.5ML
0.5 SUSPENSION INTRAMUSCULAR ONCE
Refills: 0 | Status: COMPLETED | OUTPATIENT
Start: 2021-12-02 | End: 2021-12-02

## 2021-12-02 RX ORDER — CIPROFLOXACIN LACTATE 400MG/40ML
1 VIAL (ML) INTRAVENOUS
Qty: 14 | Refills: 0
Start: 2021-12-02 | End: 2021-12-08

## 2021-12-02 RX ADMIN — TETANUS TOXOID, REDUCED DIPHTHERIA TOXOID AND ACELLULAR PERTUSSIS VACCINE, ADSORBED 0.5 MILLILITER(S): 5; 2.5; 8; 8; 2.5 SUSPENSION INTRAMUSCULAR at 22:18

## 2021-12-02 RX ADMIN — Medication 750 MILLIGRAM(S): at 22:19

## 2021-12-02 NOTE — ED PROVIDER NOTE - PATIENT PORTAL LINK FT
You can access the FollowMyHealth Patient Portal offered by [facility] by registering at the following website: [email or web address]. By joining [employer]’s FollowMyHealth portal, you will also be able to view your health information using other applications (apps) compatible with our system.

## 2021-12-02 NOTE — ED PROVIDER NOTE - PHYSICAL EXAMINATION
General-alert and oriented to person place and time, nontoxic appearing, pleasant cooperative, NAD  HEENT-normocephalic, atraumatic, NT to palp, EOMI, PERRLA, no conjunctival injections,   Chest- Nt to palp, no reproducible pain  Cardio-s1,s2 present, regular rate and rhythm  Resp- talks in full sentences, symmetrical chest rise, CTA bilat,  MSK- moves all extremities, able to ambulate, Left foot, plantar  aspect of left foot with puncture wound at the mid metatarsal of the 1st toe, some erythema, no discharge,   Neuro- no focal deficits, sensation intact

## 2021-12-02 NOTE — ED PROVIDER NOTE - ATTENDING CONTRIBUTION TO CARE
32 y/o M presents with injury to left foot after he stepped on a amy nail while wearing crocs yesterday. Today with pain while walking. Unknown last tetanus, no allergies to medications.    AP - NAD, well appearing, small puncture wound to the plantar surface of the left foot with mild erythema, no streaking, no purulent drainage, full ROM, sensation intact, cap refill < 2 seconds, 2+ distal pulses.    Will update tetanus, Abx and follow up with podiatry as needed.

## 2021-12-02 NOTE — ED PROVIDER NOTE - OBJECTIVE STATEMENT
30 y/o male with no sign medical history presents to the ED c/o left foot pain after stepping on amy nail yesterday. Notes he stepped on a nail yesterday and ti went through his shoe into his foot. Notes he took out the nail, washed it and was fine yesterday but notes pain today. Difficulty walking on the foot due to pain. No fevers, no chills, no nausea or vomiting. FROM of the foot. last tetanus unknown.

## 2021-12-02 NOTE — ED PROVIDER NOTE - CLINICAL SUMMARY MEDICAL DECISION MAKING FREE TEXT BOX
puncutre wound of amy nail left plantar aspect of left foot, minimal erythema will cover with abx, tetanus, f/u with pcp

## 2021-12-02 NOTE — ED ADULT TRIAGE NOTE - PAIN: PRESENCE, MLM
ELODIA FROM [facility] PT    Pt was treated 18 times from 8/17/17 to 11/24/17.  Treatments consisted of stretching and strengthening exercises for the lumbar spine.  Goals of PT partially met and pt appeared to be doing well on his last visit. Pt did not return for any further follow up.  ELODIA from [facility] PT as pt did not continue treatment.    
complains of pain/discomfort

## 2022-04-06 ENCOUNTER — EMERGENCY (EMERGENCY)
Facility: HOSPITAL | Age: 32
LOS: 1 days | Discharge: HOME | End: 2022-04-06
Attending: STUDENT IN AN ORGANIZED HEALTH CARE EDUCATION/TRAINING PROGRAM
Payer: MEDICAID

## 2022-04-06 VITALS
RESPIRATION RATE: 20 BRPM | SYSTOLIC BLOOD PRESSURE: 122 MMHG | OXYGEN SATURATION: 98 % | DIASTOLIC BLOOD PRESSURE: 81 MMHG | TEMPERATURE: 98 F | HEART RATE: 83 BPM | WEIGHT: 199.96 LBS | HEIGHT: 73 IN

## 2022-04-06 PROCEDURE — 73564 X-RAY EXAM KNEE 4 OR MORE: CPT | Mod: 26,LT

## 2022-04-06 PROCEDURE — 99283 EMERGENCY DEPT VISIT LOW MDM: CPT | Mod: 25

## 2022-04-06 PROCEDURE — 73564 X-RAY EXAM KNEE 4 OR MORE: CPT

## 2022-04-06 PROCEDURE — 96372 THER/PROPH/DIAG INJ SC/IM: CPT

## 2022-04-06 PROCEDURE — 99284 EMERGENCY DEPT VISIT MOD MDM: CPT

## 2022-04-06 RX ORDER — KETOROLAC TROMETHAMINE 30 MG/ML
30 SYRINGE (ML) INJECTION ONCE
Refills: 0 | Status: DISCONTINUED | OUTPATIENT
Start: 2022-04-06 | End: 2022-04-06

## 2022-04-06 RX ADMIN — Medication 30 MILLIGRAM(S): at 09:13

## 2022-04-06 RX ADMIN — Medication 30 MILLIGRAM(S): at 08:52

## 2022-04-06 NOTE — ED PROVIDER NOTE - CROS ED MUSC ALL NEG
Pt awake, alert and oriented/playful. Tolerated animal crackers and Pedialyte ice pop. MD Ferrer aware and cleared pt for dc. Mother at bedside and updated on plan of care. No acute distress noted. Will continue to monitor.
Nausea / Vomiting    Nausea is the feeling that you have an upset stomach or have to vomit. As nausea gets worse, it can lead to vomiting. Vomiting occurs when stomach contents are thrown up and out of the mouth which puts you at an increased risk for dehydration. Older adults and people with other diseases or a weak immune system are at higher risk for dehydration. Drink clear fluids in small but frequent amounts as tolerated. Eat bland, easy-to-digest foods in small amounts as tolerated.     SEEK IMMEDIATE MEDICAL CARE IF YOU HAVE THE FOLLOWING SYMPTOMS: fever, inability to keep fluids down, black or bloody vomitus, black or bloody stools, lightheadedness/dizziness, chest pain, severe headache, rash, shortness of breath, cold or clammy skin, confusion, pain with urination, or any signs of dehydration.
- - -

## 2022-04-06 NOTE — ED ADULT NURSE REASSESSMENT NOTE - NS ED NURSE REASSESS COMMENT FT1
PT is AEOX4 c/o injury to left knee sp fall pt given Toradol with relief IM Pt DC instructions given by ACP diagnostic tests performed results as indicated
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
## 2022-04-06 NOTE — ED PROVIDER NOTE - PATIENT PORTAL LINK FT
You can access the FollowMyHealth Patient Portal offered by [facility] by registering at the following website: [email or web address]. By joining [employer]’s FollowMyHealth portal, you will also be able to view your health information using other applications (apps) compatible with our system.

## 2022-04-06 NOTE — ED PROVIDER NOTE - CARE PROVIDER_API CALL
Mattie Bill (DO)  Orthopaedic Surgery  [address] [place] [address]  Phone: [phone]  Fax: [phone]  Follow Up Time:
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
## 2022-04-06 NOTE — ED PROVIDER NOTE - NS ED ATTENDING STATEMENT MOD
This was a shared visit with the RICARDO. I reviewed and verified the documentation and independently performed the documented:

## 2022-04-06 NOTE — ED PROVIDER NOTE - NSFOLLOWUPINSTRUCTIONS_ED_ALL_ED_FT
Follow up with ortho.  Tylenol/motrin for pain.  Use crutches for ambulation.  keep splint clean and dry.

## 2022-04-13 ENCOUNTER — EMERGENCY (EMERGENCY)
Facility: HOSPITAL | Age: 32
LOS: 1 days | Discharge: HOME | End: 2022-04-13
Attending: EMERGENCY MEDICINE
Payer: MEDICAID

## 2022-04-13 VITALS
DIASTOLIC BLOOD PRESSURE: 77 MMHG | HEIGHT: 73 IN | SYSTOLIC BLOOD PRESSURE: 135 MMHG | WEIGHT: 199.96 LBS | RESPIRATION RATE: 20 BRPM | TEMPERATURE: 100 F | OXYGEN SATURATION: 99 % | HEART RATE: 83 BPM

## 2022-04-13 LAB
FLUAV AG NPH QL: DETECTED
FLUBV AG NPH QL: SIGNIFICANT CHANGE UP
RSV RNA NPH QL NAA+NON-PROBE: SIGNIFICANT CHANGE UP
SARS-COV-2 RNA SPEC QL NAA+PROBE: SIGNIFICANT CHANGE UP

## 2022-04-13 PROCEDURE — 99284 EMERGENCY DEPT VISIT MOD MDM: CPT

## 2022-04-13 PROCEDURE — 99283 EMERGENCY DEPT VISIT LOW MDM: CPT

## 2022-04-13 PROCEDURE — 87637 SARSCOV2&INF A&B&RSV AMP PRB: CPT

## 2022-04-13 RX ORDER — ONDANSETRON 8 MG/1
1 TABLET, FILM COATED ORAL
Qty: 9 | Refills: 0
Start: 2022-04-13 | End: 2022-04-15

## 2022-04-13 RX ORDER — IBUPROFEN 200 MG
600 TABLET ORAL ONCE
Refills: 0 | Status: COMPLETED | OUTPATIENT
Start: 2022-04-13 | End: 2022-04-13

## 2022-04-13 RX ORDER — IBUPROFEN 200 MG
1 TABLET ORAL
Qty: 20 | Refills: 0
Start: 2022-04-13 | End: 2022-04-17

## 2022-04-13 RX ORDER — ONDANSETRON 8 MG/1
4 TABLET, FILM COATED ORAL ONCE
Refills: 0 | Status: COMPLETED | OUTPATIENT
Start: 2022-04-13 | End: 2022-04-13

## 2022-04-13 RX ADMIN — ONDANSETRON 4 MILLIGRAM(S): 8 TABLET, FILM COATED ORAL at 19:01

## 2022-04-13 RX ADMIN — Medication 600 MILLIGRAM(S): at 19:01

## 2022-04-13 NOTE — ED PROVIDER NOTE - PATIENT PORTAL LINK FT
You can access the FollowMyHealth Patient Portal offered by [facility] by registering at the following website: [email or web address]. By joining [employer]’s FollowMyHealth portal, you will also be able to view your health information using other applications (apps) compatible with our system.

## 2022-04-13 NOTE — ED PROVIDER NOTE - ATTENDING CONTRIBUTION TO CARE
Betty: I performed a face to face bedside interview with patient regarding history of present illness, review of symptoms and past medical history. I completed an independent physical exam.  I have discussed patient's plan of care with advanced care provider.   I agree with note as stated above including HISTORY OF PRESENT ILLNESS, HIV, PAST MEDICAL/SURGICAL/FAMILY/SOCIAL HISTORY, ALLERGIES AND HOME MEDICATIONS, REVIEW OF SYSTEMS, PHYSICAL EXAM, MEDICAL DECISION MAKING and any PROGRESS NOTES during the time I functioned as the attending physician for this patient  unless otherwise noted. My brief assessment is as follows: 1 day fever/chills, cough, congestion, nausea. got second covid vaccine recently, no flu vaccine. no other complaints. non toxic, ctab, rrr, abd benign, neuro intact, supportive care. return precautions.

## 2022-04-13 NOTE — ED PROVIDER NOTE - NSFOLLOWUPINSTRUCTIONS_ED_ALL_ED_FT
Viral Respiratory Infection    A viral respiratory infection is an illness that affects parts of the body used for breathing, like the lungs, nose, and throat. It is caused by a germ called a virus. Symptoms can include runny nose, coughing, sneezing, fatigue, body aches, sore throat, fever, or headache. Over the counter medicine can be used to manage the symptoms but the infection typically goes away on its own in 5 to 10 days.     SEEK IMMEDIATE MEDICAL CARE IF YOU HAVE ANY OF THE FOLLOWING SYMPTOMS: shortness of breath, chest pain, fever over 10 days, or lightheadedness/dizziness.     Please follow up with your doctor within 3 days

## 2022-06-30 NOTE — ED PROVIDER NOTE - NS ED ATTENDING STATEMENT MOD
6/30/2022 8:55 AM  Location:[facility] [address] [facility]  [place]  Patient #:  [id number]  YOB: 1940          YOUR LAST HEMOGLOBIN A1CS:   No results found for: HBA1C, IBV1ZNSS    YOUR LAST LIPID PROFILE:   Lab Results   Component Value Date/Time    CHOL 174 06/23/2020 09:17 AM    HDL 68 06/23/2020 09:17 AM         Lab Results   Component Value Date/Time    GFRAA >60 04/20/2022 03:45 PM    BUN 9 04/20/2022 03:45 PM     04/20/2022 03:45 PM    K 4.1 04/20/2022 03:45 PM     04/20/2022 03:45 PM    CO2 28 04/20/2022 03:45 PM           History of Present Illness     Chief Complaint   Patient presents with    Arm Pain     Bilateral arm pain.  Back Pain     low back pain. Ms. Joy is a [de-identified] y.o. female  who presents for bilateral arm pain. Ms. Joy presents with chronic bilateral arm pain. She notes that this is her chronic type pain that has been ongoing for some time. She reports it is a constant pain, denies any loss of strength or sensation. She states it starts in her biceps and will go up into her shoulders and sometimes down to her elbows. She denies associated chest pain, shortness of breath, leg swelling or palpitations. She denies fevers or chills, rash. She does have an upcoming appointment with her orthopedist in July, an MRI of her cervical spine was ordered but has had a recent complication of a second PE, requiring her to restart her Eliquis. She has been taking this since her hospitalization for the PE in April without interruption. Her surgical history involves a right total shoulder arthroplasty last year. She also has severe OA in both the right and left glenohumeral joints. She has degenerative changes seen recently on x-rays. She is currently taking Lyrica 75 mg 5 times daily, notes this has not been helpful.   At one  point she was on sulindac which did help her symptoms, however this was stopped after her Eliquis was initiated. No Known Allergies  Past Medical History:   Diagnosis Date    Anxiety state, unspecified 2015    takes lorazepam     Arthritis     Diverticulosis of colon (without mention of hemorrhage)     pt states hx of diverticulosis; pt states no problems now    GERD (gastroesophageal reflux disease)     managed w/med    History of hypoglycemia     Hypertension     managed w/meds    Multinodular goiter 3/1/2018    Overweight(278.02)     bmi 38.6    Preglaucoma, unspecified     Sinus problem      Social History     Socioeconomic History    Marital status:      Spouse name: Not on file    Number of children: Not on file    Years of education: Not on file    Highest education level: Not on file   Occupational History    Not on file   Tobacco Use    Smoking status: Former Smoker     Packs/day: 0.20     Quit date: 1990     Years since quittin.0    Smokeless tobacco: Never Used   Substance and Sexual Activity    Alcohol use: No    Drug use: No    Sexual activity: Not on file   Other Topics Concern    Not on file   Social History Narrative    Not on file     Social Determinants of Health     Financial Resource Strain:     Difficulty of Paying Living Expenses: Not on file   Food Insecurity:     Worried About [facility] in the Last Year: Not on file    Briseyda of Food in the Last Year: Not on file   Transportation Needs:     Lack of Transportation (Medical): Not on file    Lack of Transportation (Non-Medical):  Not on file   Physical Activity:     Days of Exercise per Week: Not on file    Minutes of Exercise per Session: Not on file   Stress:     Feeling of Stress : Not on file   Social Connections:     Frequency of Communication with Friends and Family: Not on file    Frequency of Social Gatherings with Friends and Family: Not on file    Attends Spiritism Services: Not on file    Active Member of Clubs or Organizations: Not on file    Attends [employer] or Organization Meetings: Not on file    Marital Status: Not on file   Intimate Partner Violence:     Fear of Current or Ex-Partner: Not on file    Emotionally Abused: Not on file    Physically Abused: Not on file    Sexually Abused: Not on file   Housing Stability:     Unable to Pay for Housing in the Last Year: Not on file    Number of Mendez in the Last Year: Not on file    Unstable Housing in the Last Year: Not on file     Past Surgical History:   Procedure Laterality Date    BREAST BIOPSY Right     CERVICAL LAMINECTOMY  11/2018    Cervical laminoplasty C4-C6    CHOLECYSTECTOMY  2003   [address] OOPHORECTOMY Right 1981    TOTAL KNEE ARTHROPLASTY Right 9/1/2015    TOTAL SHOULDER ARTHROPLASTY Right 07/2021     Current Outpatient Medications   Medication Sig Dispense Refill    GARLIC PO Take by mouth      acetaminophen (TYLENOL) 500 MG tablet Take 1,000 mg by mouth every 8 hours as needed      apixaban (ELIQUIS) 5 MG TABS tablet Take eliquis 5 mg twice daily until further evaluation by hematology oncology      ascorbic acid (VITAMIN C) 500 MG tablet Take 1,000 mg by mouth daily      ergocalciferol (ERGOCALCIFEROL) 1.25 MG (00024 UT) capsule Take 50,000 Units by mouth every 7 days      escitalopram (LEXAPRO) 10 MG tablet Take 20 mg by mouth daily      fluticasone (FLONASE) 50 MCG/ACT nasal spray 2 sprays by Nasal route daily as needed      lisinopril (PRINIVIL;ZESTRIL) 20 MG tablet Take 20 mg by mouth daily      loratadine (CLARITIN) 10 MG tablet Take 10 mg by mouth daily as needed      LORazepam (ATIVAN) 0.5 MG tablet TAKE ONE TABLET BY MOUTH TWICE DAILY AS NEEDED FOR ANXIETY MAX DAILY AMOUNT 1MG      omeprazole (PRILOSEC) 40 MG delayed release capsule Take 40 mg by mouth daily      pregabalin (LYRICA) 75 MG capsule Take 75 mg by mouth 5 times daily. No current facility-administered medications for this visit.      Health Maintenance   Topic Date Due    Shingles vaccine (2 of 3) 01/15/2018    Annual Wellness Visit (AWV)  12/01/2022    Depression Monitoring  04/20/2023    Breast cancer screen  04/29/2024    DTaP/Tdap/Td vaccine (3 - Td or Tdap) 09/28/2028    DEXA (modify frequency per FRAX score)  Completed    Flu vaccine  Completed    Pneumococcal 65+ years Vaccine  Completed    COVID-19 Vaccine  Completed    Hepatitis A vaccine  Aged Out    Hepatitis B vaccine  Aged Out    Hib vaccine  Aged Out    Meningococcal (ACWY) vaccine  Aged Out     Family History   Problem Relation Age of Onset    Alzheimer's Disease Father     Dementia Father     Stroke Mother     Thyroid Disease Neg Hx     Diabetes Neg Hx     Thyroid Cancer Neg Hx              Review of Systems  Review of Systems   Constitutional: Negative for chills and fever. Respiratory: Negative for shortness of breath and wheezing. Cardiovascular: Negative for chest pain, palpitations and leg swelling. Gastrointestinal: Negative for nausea and vomiting. Musculoskeletal: Positive for arthralgias, back pain, gait problem, joint swelling, myalgias, neck pain and neck stiffness. Skin: Negative for rash. All other systems reviewed and are negative. /62 (Site: Left Upper Arm, Position: Sitting, Cuff Size: Large Adult)   Pulse 65   Temp 98.1 °F (36.7 °C) (Skin)   Ht 5' 4\" (1.626 m)   Wt 220 lb (99.8 kg)   SpO2 98%   BMI 37.76 kg/m²       Physical Exam    Physical Exam  Vitals and nursing note reviewed. Constitutional:       General: She is not in acute distress. Appearance: She is obese. She is not ill-appearing, toxic-appearing or diaphoretic. HENT:      Mouth/Throat:      Mouth: Mucous membranes are moist.   Cardiovascular:      Rate and Rhythm: Regular rhythm. Pulmonary:      Effort: No respiratory distress. Breath sounds: No wheezing, rhonchi or rales. Musculoskeletal:      Right shoulder: No swelling, deformity, bony tenderness or crepitus. Decreased range of motion. Normal strength. Normal pulse. Left shoulder: No swelling, deformity, bony tenderness or crepitus. Decreased range of motion. Normal strength. Normal pulse. Cervical back: No swelling, edema, erythema, signs of trauma or bony tenderness. Pain with movement present. Normal range of motion. Thoracic back: Normal.      Right lower leg: No edema. Left lower leg: No edema. Neurological:      Mental Status: She is oriented to person, place, and time. Gait: Gait abnormal (using a rollator). Assessment & Plan         Current Outpatient Medications   Medication Sig Dispense Refill    GARLIC PO Take by mouth      acetaminophen (TYLENOL) 500 MG tablet Take 1,000 mg by mouth every 8 hours as needed      apixaban (ELIQUIS) 5 MG TABS tablet Take eliquis 5 mg twice daily until further evaluation by hematology oncology      ascorbic acid (VITAMIN C) 500 MG tablet Take 1,000 mg by mouth daily      ergocalciferol (ERGOCALCIFEROL) 1.25 MG (82412 UT) capsule Take 50,000 Units by mouth every 7 days      escitalopram (LEXAPRO) 10 MG tablet Take 20 mg by mouth daily      fluticasone (FLONASE) 50 MCG/ACT nasal spray 2 sprays by Nasal route daily as needed      lisinopril (PRINIVIL;ZESTRIL) 20 MG tablet Take 20 mg by mouth daily      loratadine (CLARITIN) 10 MG tablet Take 10 mg by mouth daily as needed      LORazepam (ATIVAN) 0.5 MG tablet TAKE ONE TABLET BY MOUTH TWICE DAILY AS NEEDED FOR ANXIETY MAX DAILY AMOUNT 1MG      omeprazole (PRILOSEC) 40 MG delayed release capsule Take 40 mg by mouth daily      pregabalin (LYRICA) 75 MG capsule Take 75 mg by mouth 5 times daily. No current facility-administered medications for this visit. 1. Bilateral arm pain  She declines repeat EKG, states this is her normal pain. Will rule out PMR, trial a Medrol Dosepak. Have titrated up her Lyrica slightly.   Knows to call the office for any new or worsening symptoms, does have follow-up with her orthopedist upcoming.  - methylPREDNISolone (MEDROL DOSEPACK) 4 MG tablet; Take by mouth. Dispense: 1 kit; Refill: 0  - Sedimentation Rate; Future  - pregabalin (LYRICA) 150 MG capsule; Take 1 capsule by mouth in the morning, at noon, and at bedtime for 30 days. Dispense: 90 capsule; Refill: 3  - Sedimentation Rate    2. Chronic fatigue  - CBC with Auto Differential; Future  - Comprehensive Metabolic Panel;  Future  - Comprehensive Metabolic Panel  - CBC with Auto Differential          Earle Grandchild, NP, APRN - CNP Attending Only

## 2022-07-08 NOTE — ED PROVIDER NOTE - HEAD SHAPE
[facility]  Procedure History and Physical Update          Patient Name: Orlin Cowart  MRN:    [id number]  YOB: 1961  Date of evaluation:  7/8/2022    Procedure:    Cardiac cath +/- PCI    Indication for procedure:  Abnormal stress test    Please refer to the office note completed by Dr. Ilene Lopez on 6/22/2022 in the medical record and note that:    [x] I have examined the patient and reviewed the H&P/Consult and there are no changes to be made to the assessment or plan.     [] I have examined the patient and reviewed the H&P/Consult and have noted the following changes:    Past Medical History:   Diagnosis Date    Arthritis     Bronchitis     On ICS-LABA, denies asthma, copd    CHF [facility]congestive heart failure) ([facility] [address]     COVID-19     diagnosed in September 2020, hospitilized on oxgyen    Fibromyalgia     GERD (gastroesophageal reflux disease)     Gout 10/2019    History of heart attack     HLD (hyperlipidemia)     Hypertension     Insomnia     Osteoarthritis        Past Surgical History:   Procedure Laterality Date    BACK SURGERY      CHOLECYSTECTOMY, LAPAROSCOPIC      HYSTERECTOMY, TOTAL ABDOMINAL (CERVIX REMOVED)      KNEE ARTHROSCOPY Right     KNEE SURGERY Left 2009    NECK SURGERY      SHOULDER SURGERY Right 2009    THYROIDECTOMY         Family History   Problem Relation Age of Onset    Other Mother     Diabetes Mother     Heart Disease Mother     Arthritis Mother     Kidney Disease Mother     Lupus Mother     Arthritis Sister     Diabetes Brother     Asthma Brother     Cancer Maternal Grandfather     Hypertension Sister     Breast Cancer Sister         35s    Breast Cancer Niece         30-35       Allergies   Allergen Reactions    Entresto [Sacubitril-Valsartan] Other (See Comments)     Acute kidney injury    Food Swelling     peaches    Gabapentin Swelling    Lyrica [Pregabalin] Swelling     Mouth sores    Tetracyclines & Related        Prior to Admission medications    Medication Sig Start Date End Date Taking?  Authorizing Provider   dextromethorphan-guaiFENesin (ROBITUSSIN-DM)  MG/5ML syrup Take 5 mLs by mouth every 4 hours as needed for Cough 6/16/22   Dorcas Gaming MD   Ergocalciferol (VITAMIN D) 78158 units CAPS Take 50,000 Units by mouth once a week 6/21/22   Dorcas Gaming MD   tiZANidine (ZANAFLEX) 4 MG tablet Take 1 tablet by mouth 3 times daily as needed (pain) 5/26/22   ASA Storey CNP   levothyroxine (SYNTHROID) 50 MCG tablet Take 1 tablet by mouth Daily 5/25/22 6/24/22  ASA Storey CNP   pantoprazole (PROTONIX) 40 MG tablet Take 1 tablet by mouth daily 5/18/22   ASA Storey CNP   metoprolol tartrate (LOPRESSOR) 50 MG tablet Take 1 tablet by mouth 2 times daily 5/18/22   ASA Storey CNP   bumetanide (BUMEX) 1 MG tablet Take 2 tablets by mouth daily 5/18/22   ASA Storey CNP   DULoxetine (CYMBALTA) 60 MG extended release capsule Take 2 capsules by mouth daily 5/18/22   ASA Storey CNP   montelukast (SINGULAIR) 10 MG tablet Take 1 tablet by mouth nightly 5/18/22   ASA Storey CNP   allopurinol (ZYLOPRIM) 300 MG tablet Take 1 tablet by mouth daily 5/18/22   ASA Storey CNP   hydrALAZINE (APRESOLINE) 25 MG tablet Take 1 tablet by mouth 2 times daily 5/18/22   ASA Storey CNP   isosorbide dinitrate (ISORDIL) 10 MG tablet Take 1 tablet by mouth 2 times daily 5/18/22   ASA tSorey CNP   buPROPion (WELLBUTRIN XL) 300 MG extended release tablet Take 1 tablet by mouth every morning 5/18/22   ASA Storey CNP   spironolactone (ALDACTONE) 25 MG tablet Take 1 tablet by mouth daily 5/18/22   ASA Storey CNP   oxybutynin (DITROPAN) 5 MG tablet Take 1 tablet by mouth 2 times daily 5/18/22   ASA Storey CNP   traZODone (DESYREL) 150 MG tablet Take 1 tablet by mouth nightly 5/12/22   Trupti Calles ASA - CNP   clopidogrel (PLAVIX) 75 MG tablet Take 1 tablet by mouth daily 5/12/22   ASA Bennett CNP   SYMBICORT 80-4.5 MCG/ACT AERO Inhale 2 puffs into the lungs 2 times daily 5/12/22 6/23/22  ASA Bennett CNP   albuterol sulfate  (90 Base) MCG/ACT inhaler Inhale 2 puffs into the lungs every 4 hours as needed for Shortness of Breath 5/12/22   ASA Bennett CNP   atorvastatin (LIPITOR) 80 MG tablet Take 1 tablet by mouth daily 5/12/22   ASA Bennett CNP   polyethylene glycol (GLYCOLAX) 17 g packet polyethylene glycol 3350 17 gram/dose oral powder 5/11/21   ASA Cervantes CNP   aspirin 81 MG EC tablet Take 81 mg by mouth    Historical Provider, MD   sucralfate (CARAFATE) 1 GM tablet Take 1 tablet in AM, 2 tablet in evening daily 12/9/20   Ginger Bird PA-C   albuterol (PROVENTIL) (2.5 MG/3ML) 0.083% nebulizer solution Take 3 mLs by nebulization every 6 hours as needed for Wheezing 10/15/20   Ginger Bird PA-C   Handicap Placard MISC by Does not apply route 2 years 8/13/20   Ginger Bird PA-C   Respiratory Therapy Supplies ([facility]) KIT Provide insurance covered nebulizer, use daily as needed 9/16/19 6/23/22  Ginger Bird PA-C         There were no vitals filed for this visit. Constitutional and General Appearance:   alert, cooperative, no distress and appears stated age  [de-identified]:  · PERRL, EOMI  Respiratory:  · Normal excursion and expansion without use of accessory muscles  · Resp Auscultation:  Good respiratory effort. No for increased work of breathing. On auscultation: clear to auscultation bilaterally  Cardiovascular:  · Regular rate and rhythm. · S1/S2  · No murmurs.   · The apical impulse is not displaced  Abdomen:  · Soft  · Bowel sounds present  · Non-tender to palpation  Extremities:  · No cyanosis or clubbing  · Lower extremity edema: No.  Skin:  · Warm and dry  Neurological:  · Alert and oriented. · Moves all extremities well      Echo 11/18/2020  Dilated left ventricle with moderately reduced systolic function. Estimated LVEF 30-35% with global hypokinesia. Evidence of moderate (grade II) diastolic dysfunction. Normal right ventricular size and function. No significant valvular regurgitation or stenosis seen. No significant pericardial effusion is seen. Plan:  1. Chronic systolic heart failure, ischemic versus nonischemic cardiomyopathy, LVEF 35%, unclear etiology. Maintained on Bumex 1 mg b.i.d. as per Nephrology recommendations. Continue Lopressor, Aldactone and hydralazine plus Imdur  Patient is intolerant to Ace inhibitors/Entresto due to History of SUSU. given persistently reduced LVEF and recent exacerbation of CHF, I recommend getting ischemia workup with repeat coronary angiogram. Risks, benefits and alternatives of the procedure discussed with patient in detail. She verbalized understanding and willing to proceed. 2. Chronic bronchitis    3. Hypertension, fairly controlled, cont current meds    4. Morbid obesity, counseled regarding weight loss and low salt/low calorie diet      Risks, benefits, and alternatives of cardiac catheterization were discussed, in detail, with patient. Risks include, but not limited to, bleeding, requiring blood transfusion, vascular complication requiring surgery, renal failure with need of dialysis, CVA, MI, death and anesthesia complications including intubation were discussed. Patient verbalized understanding and agreed to proceed with the procedure understanding the above risks and alternatives to the procedure. Gerber Lagos MD. PGY- 4  Fellow, cardiovascular disease   [place], [place]  7/8/2022, 9:05 AM    Attending Physician Statement  I have discussed the care of Juan Martinez, including pertinent history and exam findings,  with the cardiology fellow/resident.  I have seen and examined the patient and the key elements of all parts of the encounter have been performed by me. I have completed at least one if not all key elements of the E/M (history, physical exam, and MDM).     Bridger Richards MD, [facility] normal cephalic

## 2023-02-12 ENCOUNTER — NON-APPOINTMENT (OUTPATIENT)
Age: 33
End: 2023-02-12

## 2023-05-17 NOTE — ED STATDOCS - TEMPLATE, MLM
"Timothy Mora "Timothy Mora"Isak was seen and treated in our emergency department on 5/16/2023.  He may return to work on 05/18/2023.       If you have any questions or concerns, please don't hesitate to call.      Kostas Meza NP" Abdominal pain

## 2023-08-10 ENCOUNTER — EMERGENCY (EMERGENCY)
Facility: HOSPITAL | Age: 33
LOS: 1 days | Discharge: HOME | End: 2023-08-10
Attending: EMERGENCY MEDICINE
Payer: MEDICAID

## 2023-08-10 VITALS
TEMPERATURE: 98 F | DIASTOLIC BLOOD PRESSURE: 95 MMHG | WEIGHT: 221.56 LBS | SYSTOLIC BLOOD PRESSURE: 138 MMHG | OXYGEN SATURATION: 97 % | RESPIRATION RATE: 18 BRPM | HEART RATE: 66 BPM

## 2023-08-10 PROBLEM — Z78.9 OTHER SPECIFIED HEALTH STATUS: Chronic | Status: ACTIVE | Noted: 2019-08-06

## 2023-08-10 PROCEDURE — 70450 CT HEAD/BRAIN W/O DYE: CPT | Mod: MA

## 2023-08-10 PROCEDURE — 96375 TX/PRO/DX INJ NEW DRUG ADDON: CPT

## 2023-08-10 PROCEDURE — 96374 THER/PROPH/DIAG INJ IV PUSH: CPT

## 2023-08-10 PROCEDURE — 99284 EMERGENCY DEPT VISIT MOD MDM: CPT | Mod: 25

## 2023-08-10 PROCEDURE — 70450 CT HEAD/BRAIN W/O DYE: CPT | Mod: 26,MA

## 2023-08-10 PROCEDURE — 99284 EMERGENCY DEPT VISIT MOD MDM: CPT

## 2023-08-10 RX ORDER — ACETAMINOPHEN 500 MG
1000 TABLET ORAL ONCE
Refills: 0 | Status: COMPLETED | OUTPATIENT
Start: 2023-08-10 | End: 2023-08-10

## 2023-08-10 RX ORDER — METOCLOPRAMIDE HCL 10 MG
10 TABLET ORAL ONCE
Refills: 0 | Status: COMPLETED | OUTPATIENT
Start: 2023-08-10 | End: 2023-08-10

## 2023-08-10 RX ORDER — SODIUM CHLORIDE 9 MG/ML
1000 INJECTION, SOLUTION INTRAVENOUS ONCE
Refills: 0 | Status: COMPLETED | OUTPATIENT
Start: 2023-08-10 | End: 2023-08-10

## 2023-08-10 RX ADMIN — SODIUM CHLORIDE 1000 MILLILITER(S): 9 INJECTION, SOLUTION INTRAVENOUS at 05:42

## 2023-08-10 RX ADMIN — Medication 10 MILLIGRAM(S): at 05:42

## 2023-08-10 RX ADMIN — Medication 400 MILLIGRAM(S): at 05:42

## 2023-08-10 NOTE — ED PROVIDER NOTE - NSFOLLOWUPINSTRUCTIONS_ED_ALL_ED_FT
- Please follow-up with your primary care doctor in the next 5-7 days.  Please call tomorrow for an appointment.  If you cannot follow-up with your primary care doctor please return to the ED for any urgent issues.  - You were given a copy of the tests performed today.  Please bring the results with you and review them with your primary care doctor.  - If you have any worsening of symptoms or any other concerns please return to the ED immediately.  - Please continue taking your home medications as directed.     Headache    A headache is pain or discomfort felt around the head or neck area. The specific cause of a headache may not be found as there are many types including tension headaches, migraine headaches, and cluster headaches. Watch your condition for any changes. Things you can do to manage your pain include taking over the counter and prescription medications as instructed by your health care provider, lying down in a dark quiet room, limiting stress, getting regular sleep, and refraining from alcohol and tobacco products.    SEEK IMMEDIATE MEDICAL CARE IF YOU HAVE ANY OF THE FOLLOWING SYMPTOMS: fever, vomiting, stiff neck, loss of vision, problems with speech, muscle weakness, loss of balance, trouble walking, passing out, or confusion.

## 2023-08-10 NOTE — ED ADULT TRIAGE NOTE - CHIEF COMPLAINT QUOTE
patient states he has had headache x 3 days, states vision "feels worse than usual." +light sensitivity. took ibuprofen at midnight with little relief.

## 2023-08-10 NOTE — ED PROVIDER NOTE - PHYSICAL EXAMINATION
Gen: No acute distress, non toxic  HEENT: Mucous membranes moist, pink conjunctivae, EOMI  CV: RRR, nl s1/s2.  Resp: CTAB, normal rate and effort  GI: Abdomen soft, NT, ND. No rebound, no guarding  Neuro: A&O x4, MAEx4. 5/5 str ext x 4. Sensation intact, symmetric throughout. No fnd's. cerebellar fxn intact.   MSK: No spine or joint tenderness to palpation  Skin: No rashes. intact and perfused. cap refill <2sec  Vascular: Radial and dorsalis pedal pulses 2+ b/l. No LE edema

## 2023-08-10 NOTE — ED PROVIDER NOTE - OBJECTIVE STATEMENT
34 yo male with no pmhx presents with h/a x3 days. pt localizes the pain to the left side of the head, reports it was gradual onset. worsening of pain when laying down. states he has been taking ibuprofen for the pain, last dose was at 12 am, with no relief. Denies ever having a h/a like this before.   Denies fever, chills, body aches, dizziness, palpitations, cp, palpitations, sob, abd pain, n/v/c/d, dysuria, hematuria, paresthesias in the extremities, weakness, rashes.

## 2023-08-10 NOTE — ED PROVIDER NOTE - ATTENDING APP SHARED VISIT CONTRIBUTION OF CARE
34 yo M c/o severe L sided  h/a x last 3 days,  Pt taking IBU without relief.  Pain worse wit position.  Pt denies hx of similar h/a in the past.  On exam awake and alert appears uncomfortable.  Will check CT head and re-eval after meds

## 2023-08-10 NOTE — ED PROVIDER NOTE - CARE PROVIDER_API CALL
Preet Mitchell  Neurology  [address] [place] [address], [facility] [address]  [place] [address]  Phone: [phone]  Fax: [phone]  Follow Up Time:

## 2023-08-10 NOTE — ED PROVIDER NOTE - CLINICAL SUMMARY MEDICAL DECISION MAKING FREE TEXT BOX
34 yo male with no pmhx presents with h/a x3 days. neurovascularly intact, no fnd's. vss, hemodynamically stable. 34 yo male with no pmhx presents with h/a x3 days. neurovascularly intact, no fnd's. vss, hemodynamically stable. ct head negative, Pt reassessed, pt feeling better at this time, vss, pt able to walk, talk and vocalized plan of action. Discussed in depth and explained to pt in depth the next steps that need to be taking including proper follow up with PCP or specialists. All incidental findings were discussed with pt as well. Pt verbalized their concerns and all questions were answered. Pt understands dispo and wants discharge. Given good instructions when to return to ED and importance of f/u.

## 2023-08-10 NOTE — ED PROVIDER NOTE - PATIENT PORTAL LINK FT
You can access the FollowMyHealth Patient Portal offered by [facility] by registering at the following website: [email or web address]. By joining [employer]’s FollowMyHealth portal, you will also be able to view your health information using other applications (apps) compatible with our system.

## 2023-08-10 NOTE — ED ADULT NURSE NOTE - OBJECTIVE STATEMENT
Assumed pt care 0540. A&Ox4. c/o HA for 3 days with intermittent dizziness. PT reports increased sensitivity to light. Pt denies N/V/D, blurred vision, fevers/chills, SOB, CP. No PMH. Respirations even & unlabored. NAD. Pt made aware of plan of care and verbalized understanding.

## 2024-07-11 ENCOUNTER — EMERGENCY (EMERGENCY)
Facility: HOSPITAL | Age: 34
LOS: 1 days | Discharge: HOME | End: 2024-07-11
Attending: STUDENT IN AN ORGANIZED HEALTH CARE EDUCATION/TRAINING PROGRAM
Payer: COMMERCIAL

## 2024-07-11 VITALS
HEIGHT: 73 IN | WEIGHT: 217.82 LBS | DIASTOLIC BLOOD PRESSURE: 82 MMHG | OXYGEN SATURATION: 97 % | SYSTOLIC BLOOD PRESSURE: 133 MMHG | RESPIRATION RATE: 18 BRPM | HEART RATE: 87 BPM

## 2024-07-11 PROCEDURE — 73030 X-RAY EXAM OF SHOULDER: CPT | Mod: 26,LT

## 2024-07-11 PROCEDURE — 73000 X-RAY EXAM OF COLLAR BONE: CPT

## 2024-07-11 PROCEDURE — 73030 X-RAY EXAM OF SHOULDER: CPT

## 2024-07-11 PROCEDURE — 73000 X-RAY EXAM OF COLLAR BONE: CPT | Mod: 26,LT

## 2024-07-11 PROCEDURE — 99285 EMERGENCY DEPT VISIT HI MDM: CPT

## 2024-07-11 PROCEDURE — 99284 EMERGENCY DEPT VISIT MOD MDM: CPT

## 2024-07-11 RX ORDER — METHOCARBAMOL 500 MG
1500 TABLET ORAL ONCE
Refills: 0 | Status: COMPLETED | OUTPATIENT
Start: 2024-07-11 | End: 2024-07-11

## 2024-07-11 RX ORDER — ACETAMINOPHEN 325 MG
975 TABLET ORAL ONCE
Refills: 0 | Status: COMPLETED | OUTPATIENT
Start: 2024-07-11 | End: 2024-07-11

## 2024-07-11 RX ORDER — METHOCARBAMOL 500 MG
2 TABLET ORAL
Qty: 18 | Refills: 0
Start: 2024-07-11 | End: 2024-07-13

## 2024-07-11 RX ADMIN — Medication 1500 MILLIGRAM(S): at 12:48

## 2024-07-11 RX ADMIN — Medication 975 MILLIGRAM(S): at 12:48

## 2024-07-17 ENCOUNTER — EMERGENCY (EMERGENCY)
Facility: HOSPITAL | Age: 34
LOS: 1 days | Discharge: LEFT BEFORE DISCHARGE | End: 2024-07-17

## 2024-07-17 VITALS
TEMPERATURE: 99 F | HEART RATE: 81 BPM | HEIGHT: 73 IN | RESPIRATION RATE: 18 BRPM | OXYGEN SATURATION: 98 % | WEIGHT: 212.97 LBS | DIASTOLIC BLOOD PRESSURE: 98 MMHG | SYSTOLIC BLOOD PRESSURE: 154 MMHG

## 2024-07-17 PROCEDURE — L9991: CPT

## 2025-01-06 NOTE — ED ADULT NURSE NOTE - OBJECTIVE STATEMENT
Doing well on Jardiance.  Continue Jardiance 10 mg QD  A1C 5.9  Recheck A1C in 6 months  Follow with HOPE dietraf          This is a 28 y/o male received via ems AXO&4 C/O nausea and vomiting associated with abdominal pain after eating at [employer]. Patient report pain is 8/10 and described it has sharp. Denies any fever, chills, sob, chest pain. Abdomen soft nontender nondistended, lab drawn and sent, plan of care explained to patient will monitor support and safety maintained.